# Patient Record
Sex: FEMALE | Race: BLACK OR AFRICAN AMERICAN | URBAN - METROPOLITAN AREA
[De-identification: names, ages, dates, MRNs, and addresses within clinical notes are randomized per-mention and may not be internally consistent; named-entity substitution may affect disease eponyms.]

---

## 2022-08-03 ENCOUNTER — OFFICE VISIT (OUTPATIENT)
Dept: URBAN - METROPOLITAN AREA CLINIC 60 | Facility: CLINIC | Age: 47
End: 2022-08-03
Payer: COMMERCIAL

## 2022-08-03 DIAGNOSIS — H17.9 CORNEAL SCAR: ICD-10-CM

## 2022-08-03 DIAGNOSIS — R51.9 HEADACHE: Primary | ICD-10-CM

## 2022-08-03 PROCEDURE — 92004 COMPRE OPH EXAM NEW PT 1/>: CPT | Performed by: OPTOMETRIST

## 2022-08-03 PROCEDURE — 92082 INTERMEDIATE VISUAL FIELD XM: CPT | Performed by: OPTOMETRIST

## 2022-08-03 ASSESSMENT — VISUAL ACUITY
OD: 20/20
OS: 20/20

## 2022-08-03 ASSESSMENT — INTRAOCULAR PRESSURE
OS: 17
OD: 17

## 2022-08-03 NOTE — IMPRESSION/PLAN
Impression: Headache: R51.9. Plan: FDT done today to rule out other neurological causes. Results of FDT normal OU. No ocular findings to correlate with symptoms. Patient educated. New glasses rx given.

## 2022-08-03 NOTE — IMPRESSION/PLAN
Impression: Corneal scar: H17.9. Plan: Scarring is inferior OU (OS>OD). Patient educated on findings.